# Patient Record
Sex: FEMALE | URBAN - METROPOLITAN AREA
[De-identification: names, ages, dates, MRNs, and addresses within clinical notes are randomized per-mention and may not be internally consistent; named-entity substitution may affect disease eponyms.]

---

## 2019-05-02 ENCOUNTER — EMERGENCY (EMERGENCY)
Facility: HOSPITAL | Age: 24
LOS: 1 days | Discharge: HOME | End: 2019-05-02
Attending: EMERGENCY MEDICINE | Admitting: EMERGENCY MEDICINE
Payer: MEDICAID

## 2019-05-02 VITALS
RESPIRATION RATE: 15 BRPM | OXYGEN SATURATION: 100 % | TEMPERATURE: 98 F | DIASTOLIC BLOOD PRESSURE: 74 MMHG | SYSTOLIC BLOOD PRESSURE: 110 MMHG | HEART RATE: 93 BPM

## 2019-05-02 PROCEDURE — 99283 EMERGENCY DEPT VISIT LOW MDM: CPT

## 2019-05-02 PROCEDURE — 90792 PSYCH DIAG EVAL W/MED SRVCS: CPT

## 2019-05-02 NOTE — ED BEHAVIORAL HEALTH ASSESSMENT NOTE - CURRENT MEDICATION
Abilify 5mg qd, Latuda 40mg qd, minipress 1mg qd, gabapentin 300mg qd: this was prescribed by Dr. Pena who was in rehab

## 2019-05-02 NOTE — ED BEHAVIORAL HEALTH ASSESSMENT NOTE - SUMMARY
Patient is a 24 year old single female; domiciled with mother and grandmother; non caregiver; unemployed; reports PPH of bipolar 1 disorder; Etoh use disorder (in remission),  cannabis use disorder; PTSD;  multiple prior hospitalizations (most recent in [facility] in October for reported manic sx); not currently seeing a psychiatrist but has therapist at [facility] and goes to  meetings; reports 2 parasuicidal gestures (August 2018 reports that she was going to jump off peer into [place] to drown herself) and 2 years ago cut wrist; no known history of violence or arrests; hx of cannabis abuse; hx of alcohol abuse (last used October 2019); hx of 28 day rehab program at [place] October 2018; no known history of complicated withdrawal; no active PMH; brought in by mom from home at the recommendation of her therapist in [facility] as patient disclosed to therapist today that she "wanted to be dead". Patient endorses worsening depression over past few months, and SI without specific plan or intent. She is able to safety plan, has no psychotic sx and mom has no acute safety concerns. She gives previous dx of bipolar disorder, in differential MDD vs bipolar depression vs substance induced mood disorder, R/O borderline personality disorder, R/O PTSD. Patient would  benefit from further pharmacological management including titrating Latuda and possible addition of lamotrigine, in addition to DBT. Patient declines voluntary admission when offered and as she does not pose an acute risk to self or others she does not meet criteria for involuntary admission. She will be referred to [facility].

## 2019-05-02 NOTE — ED ADULT NURSE NOTE - NSIMPLEMENTINTERV_GEN_ALL_ED
Implemented All Universal Safety Interventions:  [place] to call system. Call bell, personal items and telephone within reach. Instruct patient to call for assistance. Room bathroom lighting operational. Non-slip footwear when patient is off stretcher. Physically safe environment: no spills, clutter or unnecessary equipment. Stretcher in lowest position, wheels locked, appropriate side rails in place.

## 2019-05-02 NOTE — ED BEHAVIORAL HEALTH ASSESSMENT NOTE - DIFFERENTIAL
MDD vs bipolar depression vs substance induced mood disorder, R/O borderline personality disorder, R/O PTSD.

## 2019-05-02 NOTE — ED PROVIDER NOTE - OBJECTIVE STATEMENT
katia: hx from pt. There is a hx of bipolar disease. Today pt saw therapist and stated she no longer wanted to live. she was referred to the ED for further evaluation.  Pt states she was hospitalized 5 times in the past for suicidal ideations. Last time was 2 months prior when she went to a [place]iana and threatented to jump in. Regarding today, she did not have a plan and states she doesn't want to hurt self, but that she  feels like dying.

## 2019-05-02 NOTE — ED BEHAVIORAL HEALTH ASSESSMENT NOTE - DETAILS
parasuicidal gesture where she tried to get leg over wall of a peer, states that  where called and she was stopped, reported trying to cut wrist in past mother

## 2019-05-02 NOTE — ED ADULT TRIAGE NOTE - CHIEF COMPLAINT QUOTE
alert no distress   told therapist she wanted to die today    denies SI at present   but doesn't want to be alive   hx bipolar  anxiety has been compliant with meds  denies drugs or alcohol  Dr Archer called for  grace

## 2019-05-02 NOTE — ED BEHAVIORAL HEALTH ASSESSMENT NOTE - OTHER PAST PSYCHIATRIC HISTORY (INCLUDE DETAILS REGARDING ONSET, COURSE OF ILLNESS, INPATIENT/OUTPATIENT TREATMENT)
bipolar 1 disorder; Etoh use disorder (in remission),  cannabis use disorder; PTSD;  multiple prior hospitalizations (most recent in [facility] in October for reported manic sx); reports 2 parasuicidal gestures (August 2018 reports that she was going to jump off peer into [place] to drown herself) and 2 years ago cut wrist;
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
## 2019-05-02 NOTE — ED PROVIDER NOTE - CLINICAL SUMMARY MEDICAL DECISION MAKING FREE TEXT BOX
katia: hx bipolar and hospitalizations for suicidal ideations/attempts, today stated she no longer want to live and was sent in by he therapist. no plan to hurt self. psychiatry consulted

## 2019-05-02 NOTE — ED BEHAVIORAL HEALTH ASSESSMENT NOTE - SUICIDE PROTECTIVE FACTORS
Responsibility to family and others/Identifies reasons for living/Positive therapeutic relationships/Other

## 2019-05-02 NOTE — ED BEHAVIORAL HEALTH ASSESSMENT NOTE - HPI (INCLUDE ILLNESS QUALITY, SEVERITY, DURATION, TIMING, CONTEXT, MODIFYING FACTORS, ASSOCIATED SIGNS AND SYMPTOMS)
Patient is a 24 year old single female; domiciled with mother and grandmother; non caregiver; unemployed; reports PPH of bipolar 1 disorder; Etoh use disorder (in remission),  cannabis use disorder; PTSD;  multiple prior hospitalizations (most recent in [facility] in October for reported manic sx); reports 2 parasuicidal gestures (August 2018 reports that she was going to jump off peer into [place] to drown herself) and 2 years ago cut wrist; no known history of violence or arrests; hx of cannabis abuse; hx of alcohol abuse (last used October 2019); hx of 28 day rehab program at [place] October 2018; no known history of complicated withdrawal; no active PMH; brought in by mom from home at the recommendation of her therapist in [facility] as patient disclosed to therapist today that she "wanted to be dead".     On interview, pt is well groomed, is polite, pleasant and makes good eye contact. She is tearful at times. She states that she has been "having thoughts of wanting to be dead but does not want to hurt myself". She denies current suicidal intent or plan in the ED. She reports having SI for several months but has not acting on theses thoughts identifying responsibility to her mom as a deterrent. She endorses worsening depression over the past several months and states that for the same amount of time she has been finding it difficult to sleep as her "mind keeps going during the night". She denies nightmares. She reports some anhedonia but still enjoys going to AA meetings and finds these helpful. She denies recent sx of ella or hypomania. She reports that in the past in Sept 2018 she had a "manic episode" where she did not need to sleep for 14 days and had increased goal directed activity, rapid sleep and "strange ideas where I thought I was a different person". She denies current delusions or AVH.     Collateral hx from patient's mother Corey Shukla [phone]: Patient has had trouble sleeping for past week and they are suspecting that she might have gone out to smoke weed on Monday. Mother worries that the patient has been more depressed recently and depression has been heralded in past by sleep problems. She states that in the past she has kicked patient out of house because of substance abuse but since leaving [place] she has been "good". Mom denies any acute safety concerns for the patient but would like her to see a psychiatrist. Explained to mom that pt declines voluntary and does not meet involuntary criteria. Pt has been eating and caring for self okay. Patient is a 24 year old single female; domiciled with mother and grandmother; non caregiver; unemployed; reports PPH of bipolar 1 disorder; Etoh use disorder (in remission),  cannabis use disorder; PTSD;  multiple prior hospitalizations (most recent in [facility] in October for reported manic sx); not currently seeing a psychiatrist but has therapist at [facility] and goes to AA meetings; reports 2 parasuicidal gestures (August 2018 reports that she was going to jump off peer into [place] to drown herself) and 2 years ago cut wrist; no known history of violence or arrests; hx of cannabis abuse; hx of alcohol abuse (last used October 2019); hx of 28 day rehab program at [facility] October 2018; no known history of complicated withdrawal; no active PMH; brought in by mom from home at the recommendation of her therapist in [facility] as patient disclosed to therapist today that she "wanted to be dead".     On interview, pt is well groomed, is polite, pleasant and makes good eye contact. She is tearful at times. She states that she has been "having thoughts of wanting to be dead but does not want to hurt myself". She denies current suicidal intent or plan in the ED. She reports having SI for several months but has not acting on theses thoughts identifying responsibility to her mom as a deterrent. She endorses worsening depression over the past several months and states that for the same amount of time she has been finding it difficult to sleep as her "mind keeps going during the night". She denies nightmares. She reports some anhedonia but still enjoys going to AA meetings and finds these helpful. She denies recent sx of ella or hypomania. She reports that in the past in Sept 2018 she had a "manic episode" where she did not need to sleep for 14 days and had increased goal directed activity, rapid sleep and "strange ideas where I thought I was a different person". She denies current delusions or AVH.     Collateral hx from patient's mother Corey Shukla [phone]: Patient has had trouble sleeping for past week and they are suspecting that she might have gone out to smoke weed on Monday. Mother worries that the patient has been more depressed recently and depression has been heralded in past by sleep problems. She states that in the past she has kicked patient out of house because of substance abuse but since leaving [place] she has been "good". Mom denies any acute safety concerns for the patient but would like her to see a psychiatrist. Explained to mom that pt declines voluntary and does not meet involuntary criteria. Pt has been eating and caring for self okay.

## 2019-05-02 NOTE — ED BEHAVIORAL HEALTH ASSESSMENT NOTE - RISK ASSESSMENT
Risks-past psych admissions, past SA, depressed mood, unemployed, hx of substance abuse. Protective factors-maintaining sobriety off EtOH; attends AA; Responsibility to family and others, Identifies reasons for living, Positive therapeutic relationships, help seeking, supportive relationship w mom, able to safety plan. Protective factors outweigh risks at this time. Patient declines voluntary admission when offered and as she does not pose an acute risk to self or others she does not meet criteria for involuntary admission. She will be referred to [facility], extensive safety planning done w pt and family.

## 2019-05-02 NOTE — ED BEHAVIORAL HEALTH ASSESSMENT NOTE - PRIMARY DX
Episode of recurrent major depressive disorder, unspecified depression episode severity Deferred condition on axis II

## 2019-05-02 NOTE — ED BEHAVIORAL HEALTH ASSESSMENT NOTE - REFERRAL / APPOINTMENT DETAILS
Follow up with [facility] crisis clinic and therapist at [facility]

## 2019-05-03 DIAGNOSIS — R69 ILLNESS, UNSPECIFIED: ICD-10-CM

## 2019-05-03 DIAGNOSIS — F10.11 ALCOHOL ABUSE, IN REMISSION: ICD-10-CM

## 2019-05-03 DIAGNOSIS — F33.9 MAJOR DEPRESSIVE DISORDER, RECURRENT, UNSPECIFIED: ICD-10-CM

## 2019-05-03 NOTE — ED ADULT NURSE REASSESSMENT NOTE - NS ED NURSE REASSESS COMMENT FT1
Patient in improved and stable condition, discharged as per MD Garcia order, discharge instructions given, pt verbalized understanding and left ER a&ox3.

## 2019-06-01 ENCOUNTER — OUTPATIENT (OUTPATIENT)
Dept: OUTPATIENT SERVICES | Facility: HOSPITAL | Age: 24
LOS: 1 days | End: 2019-06-01
Payer: MEDICAID

## 2019-06-01 PROCEDURE — G9001: CPT

## 2019-06-12 DIAGNOSIS — Z71.89 OTHER SPECIFIED COUNSELING: ICD-10-CM

## 2023-10-20 NOTE — ED BEHAVIORAL HEALTH ASSESSMENT NOTE - NS ED BHA REVIEW OF ED CHART AVAILABLE INVESTIGATIONS REVIEWED
Patient received a refill that was auto-approved by team Martha. Patient is no longer taking the medication and removed it from her active medication list but is still receiving refills for it. She would like to speak with Dr. Thomas's nurse. She was calling to return a phone call from Anni.    Yes